# Patient Record
Sex: MALE | Race: BLACK OR AFRICAN AMERICAN
[De-identification: names, ages, dates, MRNs, and addresses within clinical notes are randomized per-mention and may not be internally consistent; named-entity substitution may affect disease eponyms.]

---

## 2019-06-04 ENCOUNTER — HOSPITAL ENCOUNTER (INPATIENT)
Dept: HOSPITAL 74 - YASAS | Age: 59
LOS: 4 days | Discharge: HOME | End: 2019-06-08
Attending: SURGERY | Admitting: SURGERY
Payer: COMMERCIAL

## 2019-06-04 VITALS — BODY MASS INDEX: 18.2 KG/M2

## 2019-06-04 DIAGNOSIS — F17.210: ICD-10-CM

## 2019-06-04 DIAGNOSIS — F10.230: Primary | ICD-10-CM

## 2019-06-04 DIAGNOSIS — F14.20: ICD-10-CM

## 2019-06-04 DIAGNOSIS — B20: ICD-10-CM

## 2019-06-04 DIAGNOSIS — D72.819: ICD-10-CM

## 2019-06-04 DIAGNOSIS — E83.52: ICD-10-CM

## 2019-06-04 DIAGNOSIS — J45.909: ICD-10-CM

## 2019-06-04 LAB
APPEARANCE UR: (no result)
BILIRUB UR STRIP.AUTO-MCNC: (no result) MG/DL
COLOR UR: (no result)
KETONES UR QL STRIP: (no result)
LEUKOCYTE ESTERASE UR QL STRIP.AUTO: (no result)
NITRITE UR QL STRIP: NEGATIVE
PH UR: 6 [PH] (ref 5–8)
PROT UR QL STRIP: NEGATIVE
PROT UR QL STRIP: NEGATIVE
SP GR UR: 1.02 (ref 1.01–1.03)
UROBILINOGEN UR STRIP-MCNC: (no result) MG/DL (ref 0.2–1)

## 2019-06-04 PROCEDURE — HZ2ZZZZ DETOXIFICATION SERVICES FOR SUBSTANCE ABUSE TREATMENT: ICD-10-PCS | Performed by: SURGERY

## 2019-06-04 RX ADMIN — Medication SCH MG: at 22:09

## 2019-06-04 NOTE — HP
CIWA Score


Nausea/Vomitin-No Nausea/No Vomiting


Muscle Tremors: 2


Anxiety: 2


Agitation: 2


Paroxysmal Sweats: 2


Orientation: 0-Oriented


Tacttile Disturbances: 2-Mild Itch/Numbness/Burn


Auditory Disturbances: 0-None


Visual Disturbances: 0-None


Headache: 0-None Present


CIWA-Ar Total Score: 10





- Admission Criteria


[facility] Guidelines: Admission for Medically Managed Detox: 


Requires at least one of the followin. CIWA greater than 12


2. Seizures within the past 24 hours


3. Delirium tremens within the past 24 hours


4. Hallucinations within the past 24 hours


5. Acute intervention needed for co  occurring medical disorder


6. Acute intervention needed for co  occurring psychiatric disorder


7. Severe withdrawal that cannot be handled at a lower level of care (continued


    vomiting, continued diarrhea, abnormal vital signs) requiring intravenous


    medication and/or fluids


8. Pregnancy





Patient presents the following: Acute intervention needed for co-occurring med 

or psych disorder


Admission Criteria Met: Admission criteria met





Admission ROS [facility]





- HPI


Chief Complaint: 





withdrawal symptoms 


Allergies/Adverse Reactions: 


 Allergies











Allergy/AdvReac Type Severity Reaction Status Date / Time


 


tomato [Tomato] Allergy Mild Hives Verified 19 11:36


 


No Known Drug Allergies Allergy Unknown  Verified 19 11:36











History of Present Illness: 





58 yo male with hx of alcohol and crack/ cocaine dependence is here seeking 

detox d/t withdrawal symptoms. Last detox period of sobriety one year, last 

detox 201. PMHX: immunocompromised, asthma. Psych: Denies. Denies hx of 

seizures or blackouts. Denies SI/HI 





Exam Limitations: No Limitations





- Ebola screening


Have you traveled outside of the country in the last 21 days: No (N)


Have you had contact with anyone from an Ebola affected area: No


Do you have a fever: No





- Review of Systems


Constitutional: Chills, Loss of Appetite, Changes in sleep, Unintentional Wgt. 

Loss


EENT: reports: No Symptoms Reported


Respiratory: reports: No Symptoms reported


Cardiac: reports: No Symptoms Reported


GI: reports: Poor Appetite, Poor Fluid Intake


: reports: No Symptoms Reported


Musculoskeletal: reports: Back Pain


Integumentary: reports: Dryness, Pruritus


Neuro: reports: No Symptoms reported


Endocrine: reports: No Symptoms Reported


Hematology: reports: See HPI


Psychiatric: reports: No Sypmtoms Reported, Anxious


Other Systems: Reviewed and Negative





Patient History





- Patient Medical History


Hx Anemia: No


Hx Asthma: Yes


Hx Chronic Obstructive Pulmonary Disease (COPD): No


Hx Cancer: No


Hx Cardiac Disorders: No


Hx Congestive Heart Failure: No


Hx Hypertension: No


Hx Hypercholesterolemia: No


Hx Pacemaker: No


HX Cerebrovascular Accident: No


Hx Seizures: No


Hx Dementia: No


Hx Diabetes: No


Hx Gastrointestinal Disorders: No


Hx Liver Disease: No


Hx Genitourinary Disorders: No


Hx Sexually Transmitted Disorders: No


Hx Renal Disease (ESRD): No


Hx Thyroid Disease: No


Hx Human Immunodeficiency Virus (HIV): Yes (on truvada,reyataz and norvir)


Hx Hepatitis C: No


Hx Depression: No


Hx Suicide Attempt: No


Hx Bipolar Disorder: No


Hx Schizophrenia: No





- Patient Surgical History


Past Surgical History: No


Hx Neurologic Surgery: No


Hx Cataract Extraction: No


Hx Cardiac Surgery: No


Hx Lung Surgery: No


Hx Breast Surgery: No


Hx Breast Biopsy: No


Hx Abdominal Surgery: No


Hx Appendectomy: No


Hx Cholecystectomy: No


Hx Genitourinary Surgery: No


Hx  Section: No


Hx Orthopedic Surgery: No


Anesthesia Reaction: No





- PPD History


Previous Implant?: No


PPD to be Administered?: Yes





- Smoking Cessation


Smoking history: Current every day smoker


Have you smoked in the past 12 months: Yes


Aproximately how many cigarettes per day: 5


Hx Chewing Tobacco Use: No


Initiated information on smoking cessation: Yes


'Breaking Loose' booklet given: 19





- Substance & Tx. History


Hx Alcohol Use: Yes


Hx Substance Use: Yes


Substance Use Type: Alcohol


Hx Substance Use Treatment: Yes (Detox [facility] )





- Substances abused


  ** Alcohol


Substance route: Oral


Frequency: Daily


Amount used: BEER- 4 x 24OZ CANS


Age of first use: 20


Date of last use: 19





  ** Crack


Substance route: Smoking


Frequency: 3-6 times per week


Amount used: $30 


Age of first use: 30


Date of last use: 19





Family Disease History





- Family Disease History


Family History: Denies





Admission Physical Exam [facility]





- Vital Signs


Vital Signs: 


 Vital Signs - 24 hr











  19





  11:35


 


Temperature 98.5 F


 


Pulse Rate 80


 


Respiratory 18





Rate 


 


Blood Pressure 118/74














- Physical


General Appearance: Yes: Disheveled, Thin, Irritable, Anxious


HEENTM: Yes: EOMI, Hearing grossly Normal, Normal ENT Inspection, Normocephalic

, Normal Voice, JANENE, Pharynx Normal, Tm's normal, Other (poor dentition, 

cheilitis)


Respiratory: Yes: Chest Non-Tender, Lungs Clear, Normal Breath Sounds, No 

Respiratory Distress, No Accessory Muscle Use


Neck: Yes: Within Normal Limits


Breast: Yes: Breast Exam Deferred


Cardiology: Yes: Regular Rhythm, Regular Rate


Abdominal: Yes: Normal Bowel Sounds, Non Tender, Flat, Soft


Genitourinary: Yes: Within Normal Limits


Back: Yes: Normal Inspection


Musculoskeletal: Yes: full range of Motion, Gait Steady, Pelvis Stable


Extremities: Yes: Normal Capillary Refill, Normal Inspection, Normal Range of 

Motion


Neurological: Yes: CNs II-XII NML intact, Fully Oriented, Alert, Motor Strength 

5/5


Integumentary: Yes: Normal Color, Warm, Diaphoresis


Lymphatic: Yes: Within Normal Limits





- Diagnostic


(1) HIV (human immunodeficiency virus infection)


Current Visit: Yes   Status: Chronic   





(2) Alcohol dependence with uncomplicated withdrawal


Current Visit: Yes   Status: Acute   





(3) Nicotine dependence


Current Visit: Yes   Status: Acute   





(4) Cocaine dependence


Current Visit: Yes   Status: Acute   





(5) Asthma


Current Visit: Yes   Status: Acute   





Cleared for Admission [facility]





- Detox or Rehab


[facility] Level of Care: Medically Managed


Detox Regimen/Protocol: Librium





Breathalyzer





- Breathalyzer


Breathalyzer: 0





Urine Drug Screen





- Test Device


Lot number: [id number]


Expiration date: 21





- Control


Is test valid?: Yes





- Results


Drug screen NEGATIVE: No


Urine drug screen results: THC-Marijuana, DARIUS-Cocaine, MOP-Opiates





Inpatient Rehab Admission





- Rehab Decision to Admit


Inpatient rehab admission?: No

## 2019-06-05 LAB
ALBUMIN SERPL-MCNC: 4.5 G/DL (ref 3.4–5)
ALP SERPL-CCNC: 81 U/L (ref 45–117)
ALT SERPL-CCNC: 32 U/L (ref 13–61)
ANION GAP SERPL CALC-SCNC: 6 MMOL/L (ref 8–16)
AST SERPL-CCNC: 31 U/L (ref 15–37)
BACTERIA #/AREA URNS HPF: 9.3 /HPF
BILIRUB SERPL-MCNC: 0.4 MG/DL (ref 0.2–1)
BUN SERPL-MCNC: 9 MG/DL (ref 7–18)
CALCIUM SERPL-MCNC: 10.7 MG/DL (ref 8.5–10.1)
CAOX CRY #/AREA URNS HPF: (no result) /HPF
CASTS #/AREA URNS LPF: 12 /LPF (ref 0–8)
CHLORIDE SERPL-SCNC: 101 MMOL/L (ref 98–107)
CO2 SERPL-SCNC: 29 MMOL/L (ref 21–32)
CREAT SERPL-MCNC: 1 MG/DL (ref 0.55–1.3)
DEPRECATED RDW RBC AUTO: 14.2 % (ref 11.9–15.9)
EPITH CASTS URNS QL MICRO: 1 /HPF
GLUCOSE SERPL-MCNC: 124 MG/DL (ref 74–106)
HCT VFR BLD CALC: 41.4 % (ref 35.4–49)
HGB BLD-MCNC: 13.6 GM/DL (ref 11.7–16.9)
MCH RBC QN AUTO: 34.1 PG (ref 25.7–33.7)
MCHC RBC AUTO-ENTMCNC: 32.8 G/DL (ref 32–35.9)
MCV RBC: 104 FL (ref 80–96)
PLATELET # BLD AUTO: 192 K/MM3 (ref 134–434)
PMV BLD: 8.1 FL (ref 7.5–11.1)
POTASSIUM SERPLBLD-SCNC: 3.8 MMOL/L (ref 3.5–5.1)
PROT SERPL-MCNC: 7.8 G/DL (ref 6.4–8.2)
RBC # BLD AUTO: 3 /HPF (ref 0–4)
RBC # BLD AUTO: 3.98 M/MM3 (ref 4–5.6)
SODIUM SERPL-SCNC: 137 MMOL/L (ref 136–145)
WBC # BLD AUTO: 3.2 K/MM3 (ref 4–10)
WBC # UR AUTO: 3 /HPF (ref 0–5)

## 2019-06-05 RX ADMIN — NICOTINE SCH MG: 14 PATCH, EXTENDED RELEASE TRANSDERMAL at 10:31

## 2019-06-05 RX ADMIN — IBUPROFEN PRN MG: 400 TABLET, FILM COATED ORAL at 20:10

## 2019-06-05 RX ADMIN — DOLUTEGRAVIR SODIUM SCH MG: 50 TABLET, FILM COATED ORAL at 15:47

## 2019-06-05 RX ADMIN — Medication SCH: at 10:31

## 2019-06-05 RX ADMIN — DARUNAVIR ETHANOLATE AND COBICISTAT SCH EACH: 800; 150 TABLET, FILM COATED ORAL at 15:48

## 2019-06-05 RX ADMIN — Medication SCH MG: at 22:24

## 2019-06-05 NOTE — PN
[facility] CIWA





- CIWA Score


Nausea/Vomitin-No Nausea/No Vomiting


Muscle Tremors: 3


Anxiety: 3


Agitation: 0-Normal Activity


Paroxysmal Sweats: 3


Orientation: 0-Oriented


Tacttile Disturbances: 1-Very Mild Itch/Numbness


Auditory Disturbances: 0-None


Visual Disturbances: 2-Mild Sensitivity


Headache: 0-None Present


CIWA-Ar Total Score: 12





[facility] Progress Note (SOAP)


Subjective: 





Sweating, Anxious, Tremors.


Objective: 


PATIENT A & O X 3, OBSERVED AMBULATING ON UNIT UNASSISTED. IN NO ACUTE DISTRESS.





19 13:43


 Vital Signs











Temperature  98.6 F   19 09:29


 


Pulse Rate  89   19 09:29


 


Respiratory Rate  18   19 09:29


 


Blood Pressure  103/71   19 09:29


 


O2 Sat by Pulse Oximetry (%)      








 Laboratory Tests











  19





  14:50 06:00 06:00


 


WBC   3.2 L 


 


RBC   3.98 L 


 


Hgb   13.6 


 


Hct   41.4 


 


MCV   104.0 H 


 


MCH   34.1 H 


 


MCHC   32.8 


 


RDW   14.2 


 


Plt Count   192 


 


MPV   8.1 


 


Sodium    137


 


Potassium    3.8


 


Chloride    101


 


Carbon Dioxide    29


 


Anion Gap    6 L


 


BUN    9


 


Creatinine    1.0


 


Est GFR (CKD-EPI)AfAm    94.39


 


Est GFR (CKD-EPI)NonAf    81.44


 


Random Glucose    124 H


 


Calcium    10.7 H


 


Total Bilirubin    0.4


 


AST    31


 


ALT    32


 


Alkaline Phosphatase    81


 


Total Protein    7.8


 


Albumin    4.5


 


Urine Color  Dk yellow  


 


Urine Appearance  Cloudy  


 


Urine pH  6.0  


 


Ur Specific Gravity  1.019  


 


Urine Protein  Negative  


 


Urine Glucose (UA)  Negative  


 


Urine Ketones  1+ H  


 


Urine Blood  Negative  


 


Urine Nitrite  Negative  


 


Urine Bilirubin  1+ H  


 


Urine Urobilinogen  4.0 e.u/dl  


 


Ur Leukocyte Esterase  1+ H  


 


Urine WBC (Auto)  3  


 


Urine RBC (Auto)  3  


 


Urine Casts (Auto)  12  


 


U Epithel Cells (Auto)  1.0  


 


Urine Crystals (Auto)  2+  


 


Urine Bacteria (Auto)  9.3  








LABS NOTED.


RPR RESULT PENDING.





19 13:45





Assessment: 





19 13:45


WITHDRAWAL SYMPTOMS.


HYPERCALCEMIA.





Plan: 





CONTINUE DETOX.


INCREASE DAILY PO FLUID / WATER INTAKE.


REPEAT CALCIUM LEVEL TOMORROW FOR ELEVATED LEVEL NOTED ON DETOX ADMISSION.





PATIENT REPORTS THAT HE IS CURRENTLY PRESCRIBED TIVICAY AND PREZCOBYX FOR 

TREATMENT OF HIV. PATIENT REPORTS FULL COMPLIANCE WITH MEDICATION AND THAT HE 

LAST TOOK AT HOME YESTERDAY, BUT THAT HE DID NOT BRING MEDICATIONS WITH HIM FOR 

DETOX ADMISSION. AS PER PHARMACIST AMAR AT PATIENT'S PHARMACY ([facility]), 

BOTH MEDICATIONS WERE LAST FILLED THERE ON 2019. PHARMACIST ALSO NOTES 

THAT PATIENT HAS BEEN FILLING BOTH MEDICATIONS CONSISTENTLY OVER THE LAST FEW 

MONTHS.


AFTER CONSULTATION WITH [facility] PHARMACIST LEEANN BIRD, BOTH MEDICATION WILL BE ORDERED 

FOR THIS DETOX ADMISSION.

## 2019-06-05 NOTE — EKG
Test Reason : 

Blood Pressure : ***/*** mmHG

Vent. Rate : 067 BPM     Atrial Rate : 067 BPM

   P-R Int : 136 ms          QRS Dur : 082 ms

    QT Int : 412 ms       P-R-T Axes : 070 038 057 degrees

   QTc Int : 435 ms

 

NORMAL SINUS RHYTHM

NORMAL ECG

NO PREVIOUS ECGS AVAILABLE

Confirmed by WANDA BURNETT, BEVERLY ([id number]) on 6/5/2019 9:37:16 AM

 

Referred By:             Confirmed By:BEVERLY MUÑOZ MD

## 2019-06-06 RX ADMIN — Medication SCH MG: at 21:49

## 2019-06-06 RX ADMIN — DARUNAVIR ETHANOLATE AND COBICISTAT SCH EACH: 800; 150 TABLET, FILM COATED ORAL at 09:41

## 2019-06-06 RX ADMIN — Medication SCH TAB: at 09:39

## 2019-06-06 RX ADMIN — NICOTINE SCH MG: 14 PATCH, EXTENDED RELEASE TRANSDERMAL at 09:41

## 2019-06-06 RX ADMIN — IBUPROFEN PRN MG: 400 TABLET, FILM COATED ORAL at 09:41

## 2019-06-06 RX ADMIN — DOLUTEGRAVIR SODIUM SCH MG: 50 TABLET, FILM COATED ORAL at 09:40

## 2019-06-06 NOTE — PN
S CIWA





- CIWA Score


Nausea/Vomitin


Muscle Tremors: 2


Anxiety: 2


Agitation: 2


Paroxysmal Sweats: 1-Minimal Palms Moist


Orientation: 0-Oriented


Tacttile Disturbances: 1-Very Mild Itch/Numbness


Auditory Disturbances: 1-Very Mild


Visual Disturbances: 0-None


Headache: 2-Mild


CIWA-Ar Total Score: 13





[facility] Progress Note (SOAP)


Subjective: 





alert,irritable,anxious,interrupted sleep,tremor


Objective: 





19 11:49


 Vital Signs











Temperature  97.7 F   19 09:21


 


Pulse Rate  75   19 09:21


 


Respiratory Rate  18   19 09:21


 


Blood Pressure  106/65   19 09:21


 


O2 Sat by Pulse Oximetry (%)      











19 11:49


 Laboratory Results - last 24 hr











  19





  06:00 07:30


 


Calcium   10.1


 


RPR Titer  Nonreactive 











Assessment: 





19 11:50


withdrawal symptom


Plan: 





continue detox,

## 2019-06-07 RX ADMIN — Medication SCH TAB: at 10:50

## 2019-06-07 RX ADMIN — DOLUTEGRAVIR SODIUM SCH MG: 50 TABLET, FILM COATED ORAL at 07:40

## 2019-06-07 RX ADMIN — NICOTINE SCH MG: 14 PATCH, EXTENDED RELEASE TRANSDERMAL at 10:50

## 2019-06-07 RX ADMIN — Medication SCH: at 21:25

## 2019-06-07 RX ADMIN — IBUPROFEN PRN MG: 400 TABLET, FILM COATED ORAL at 05:35

## 2019-06-07 RX ADMIN — IBUPROFEN PRN MG: 400 TABLET, FILM COATED ORAL at 15:57

## 2019-06-07 RX ADMIN — DARUNAVIR ETHANOLATE AND COBICISTAT SCH EACH: 800; 150 TABLET, FILM COATED ORAL at 07:40

## 2019-06-07 NOTE — PN
S CIWA





- CIWA Score


Nausea/Vomitin


Muscle Tremors: 2


Anxiety: 2


Agitation: 2


Paroxysmal Sweats: 1-Minimal Palms Moist


Orientation: 0-Oriented


Tacttile Disturbances: 0-None


Auditory Disturbances: 1-Very Mild


Visual Disturbances: 0-None


Headache: 1-Very Mild


CIWA-Ar Total Score: 11





B[facility] Progress Note (SOAP)


Subjective: 





alert,irritable,anxious,interrupted sleep,pain in both shoulder


Objective: 





19 09:50


 Vital Signs











Temperature  97.9 F   19 09:17


 


Pulse Rate  74   19 09:17


 


Respiratory Rate  18   19 09:17


 


Blood Pressure  119/73   19 09:17


 


O2 Sat by Pulse Oximetry (%)      











Assessment: 





19 09:50


withdrawal symptom


Plan: 





continue detox,discharge in am,patient will follow up with medical provider at 

[facility] for medical problem and 


orthopedist at [facility] after discharge

## 2019-06-08 VITALS — TEMPERATURE: 97.5 F | HEART RATE: 81 BPM | DIASTOLIC BLOOD PRESSURE: 74 MMHG | SYSTOLIC BLOOD PRESSURE: 107 MMHG

## 2019-06-08 RX ADMIN — NICOTINE SCH: 14 PATCH, EXTENDED RELEASE TRANSDERMAL at 10:51

## 2019-06-08 RX ADMIN — DOLUTEGRAVIR SODIUM SCH MG: 50 TABLET, FILM COATED ORAL at 08:39

## 2019-06-08 RX ADMIN — DARUNAVIR ETHANOLATE AND COBICISTAT SCH EACH: 800; 150 TABLET, FILM COATED ORAL at 08:39

## 2019-06-08 RX ADMIN — Medication SCH: at 10:51

## 2019-06-08 RX ADMIN — IBUPROFEN PRN MG: 400 TABLET, FILM COATED ORAL at 08:40

## 2019-06-08 NOTE — DS
[facility] Detox Discharge Summary


Admission Date: 


06/04/19





Discharge Date: 06/08/19





- History


Present History: Alcohol Dependence





- Physical Exam Results


Vital Signs: 


 Vital Signs











Temperature  97.5 F L  06/08/19 09:33


 


Pulse Rate  81   06/08/19 09:33


 


Respiratory Rate  16   06/08/19 09:33


 


Blood Pressure  107/74   06/08/19 09:33


 


O2 Sat by Pulse Oximetry (%)      














- Treatment


Hospital Course: Detox Protocol Followed, Detoxed Safely, Responded well, 

Discharged Condition Good, Rehab Referral Accepted





- Medication


Discharge Medications: 


Ambulatory Orders





Albuterol Sulfate Inhaler - [Ventolin Hfa *Inhaler*] 2 inh IH Q4H PRN 11/13/12 


Dolutegravir Sodium [Tivicay] 50 mg PO DAILY 06/04/19 


Darunavir/Cobicistat [Prezcobix 800 mg-150 mg Tablet] 1 tablet PO DAILY 06/05/ 19 











- Diagnosis


(1) Alcohol dependence with uncomplicated withdrawal


Current Visit: Yes   Status: Chronic   





(2) Asthma


Current Visit: Yes   Status: Chronic   





(3) Cocaine dependence


Current Visit: Yes   Status: Chronic   


Qualifiers: 


   Substance use status: uncomplicated   Qualified Code(s): F14.20 - Cocaine 

dependence, uncomplicated   





(4) Hypercalcemia


Current Visit: Yes   Status: Acute   





(5) Leukopenia


Current Visit: Yes   Status: Acute   


Qualifiers: 


   Leukopenia type: unspecified   Qualified Code(s): D72.819 - Decreased white 

blood cell count, unspecified   





(6) Nicotine dependence


Current Visit: Yes   Status: Chronic   


Qualifiers: 


   Nicotine product type: cigarettes   Substance use status: uncomplicated   

Qualified Code(s): F17.210 - Nicotine dependence, cigarettes, uncomplicated   





(7) HIV (human immunodeficiency virus infection)


Current Visit: Yes   Status: Chronic   


Qualifiers: 


   HIV symptom status: unspecified   Qualified Code(s): B20 - Human 

immunodeficiency virus [HIV] disease   





(8) Acquired immune deficiency syndrome (AIDS)


Current Visit: Yes   Status: Acute   





- AMA


Did Patient Leave Against Medical Advice: No (pt referred to housing works.)